# Patient Record
Sex: FEMALE | Race: WHITE | Employment: FULL TIME | URBAN - METROPOLITAN AREA
[De-identification: names, ages, dates, MRNs, and addresses within clinical notes are randomized per-mention and may not be internally consistent; named-entity substitution may affect disease eponyms.]

---

## 2017-08-28 ENCOUNTER — HOSPITAL ENCOUNTER (OUTPATIENT)
Dept: MAMMOGRAPHY | Facility: HOSPITAL | Age: 62
Discharge: HOME | End: 2017-08-28
Attending: OBSTETRICS & GYNECOLOGY
Payer: COMMERCIAL

## 2017-08-28 DIAGNOSIS — Z12.31 VISIT FOR SCREENING MAMMOGRAM: ICD-10-CM

## 2017-08-28 PROCEDURE — 77067 SCR MAMMO BI INCL CAD: CPT | Performed by: OBSTETRICS & GYNECOLOGY

## 2018-06-06 ENCOUNTER — APPOINTMENT (OUTPATIENT)
Dept: OTHER | Facility: HOSPITAL | Age: 63
End: 2018-06-06
Attending: EMERGENCY MEDICINE

## 2018-09-12 ENCOUNTER — HOSPITAL ENCOUNTER (OUTPATIENT)
Dept: MAMMOGRAPHY | Facility: HOSPITAL | Age: 63
Discharge: HOME | End: 2018-09-12
Attending: INTERNAL MEDICINE
Payer: COMMERCIAL

## 2018-09-12 DIAGNOSIS — Z12.31 ENCOUNTER FOR SCREENING MAMMOGRAM FOR MALIGNANT NEOPLASM OF BREAST: ICD-10-CM

## 2018-09-12 PROCEDURE — 77067 SCR MAMMO BI INCL CAD: CPT | Performed by: INTERNAL MEDICINE

## 2018-11-19 ENCOUNTER — HOSPITAL ENCOUNTER (OUTPATIENT)
Age: 63
Discharge: HOME | End: 2018-11-19
Payer: COMMERCIAL

## 2018-11-19 ENCOUNTER — APPOINTMENT (OUTPATIENT)
Dept: GENERAL RADIOLOGY | Age: 63
End: 2018-11-19
Attending: NURSE PRACTITIONER
Payer: COMMERCIAL

## 2018-11-19 VITALS
WEIGHT: 205 LBS | HEART RATE: 92 BPM | OXYGEN SATURATION: 100 % | BODY MASS INDEX: 35 KG/M2 | HEIGHT: 64 IN | TEMPERATURE: 98 F | RESPIRATION RATE: 20 BRPM | SYSTOLIC BLOOD PRESSURE: 155 MMHG | DIASTOLIC BLOOD PRESSURE: 76 MMHG

## 2018-11-19 DIAGNOSIS — S92.902A CLOSED FRACTURE OF LEFT FOOT, INITIAL ENCOUNTER: Primary | ICD-10-CM

## 2018-11-19 PROCEDURE — 29515 APPLICATION SHORT LEG SPLINT: CPT

## 2018-11-19 PROCEDURE — 73630 X-RAY EXAM OF FOOT: CPT | Performed by: NURSE PRACTITIONER

## 2018-11-19 PROCEDURE — 99214 OFFICE O/P EST MOD 30 MIN: CPT

## 2018-11-19 PROCEDURE — 99204 OFFICE O/P NEW MOD 45 MIN: CPT

## 2018-11-19 NOTE — ED INITIAL ASSESSMENT (HPI)
Missed last step wearing clogs yesterday twisting left foot. Pain and tenderness to lateral foot. Swelling present. +distal CMS.

## 2018-11-19 NOTE — ED PROVIDER NOTES
Patient presents with: Foot Injury      HPI:     Joshua Barajas is a 58year old female who presents today with a chief complaint of pain in the left lateral foot at the base of the fifth metatarsal after an injury that occurred yesterday.   The patient s above.  Constitutional and Vital Signs Reviewed.       Physical Exam:   Findings:  EXTREMITIES: no cyanosis or edema   Edema  Yes  Bruising:  Yes  Tendon function intact:  Yes  Skin intact:  Yes  Normal sensation: Yes  Normal capillary refill: Yes  ROM:  Pa crutches and has a walker she will use at home. We placed a postop shoe to the bottom of the post mold to help her until she gets home to her walker.   She is aware to ice, elevate, take ibuprofen or Tylenol for discomfort, and follow-up closely with wiliam

## 2018-11-19 NOTE — ED NOTES
Splint in place. Patient has her own walker at home. Copy of xray sent with patient for follow up. Assisted to car via wheelchair.

## 2019-05-13 ENCOUNTER — WALK IN (OUTPATIENT)
Dept: URGENT CARE | Age: 64
End: 2019-05-13

## 2019-05-13 VITALS
HEART RATE: 84 BPM | BODY MASS INDEX: 36.92 KG/M2 | TEMPERATURE: 98.6 F | RESPIRATION RATE: 16 BRPM | DIASTOLIC BLOOD PRESSURE: 80 MMHG | HEIGHT: 64 IN | WEIGHT: 216.27 LBS | SYSTOLIC BLOOD PRESSURE: 122 MMHG

## 2019-05-13 DIAGNOSIS — N30.00 ACUTE CYSTITIS WITHOUT HEMATURIA: Primary | ICD-10-CM

## 2019-05-13 DIAGNOSIS — R39.15 URINARY URGENCY: ICD-10-CM

## 2019-05-13 LAB
APPEARANCE, POC: CLEAR
BILIRUBIN, POC: NEGATIVE
COLOR, POC: NORMAL
GLUCOSE UR-MCNC: NEGATIVE MG/DL
KETONES, POC: NEGATIVE
NITRITE, POC: NEGATIVE
OCCULT BLOOD, POC: NEGATIVE
PH UR: 5 [PH] (ref 5–7)
PROT UR-MCNC: NEGATIVE G/DL
SP GR UR: 1 (ref 1–1.03)
UROBILINOGEN UR-MCNC: 0.2 MG/DL (ref 0–1)
WBC (LEUKOCYTE) ESTERASE, POC: NORMAL

## 2019-05-13 PROCEDURE — 99203 OFFICE O/P NEW LOW 30 MIN: CPT | Performed by: NURSE PRACTITIONER

## 2019-05-13 PROCEDURE — 81002 URINALYSIS NONAUTO W/O SCOPE: CPT | Performed by: NURSE PRACTITIONER

## 2019-05-13 PROCEDURE — 87086 URINE CULTURE/COLONY COUNT: CPT | Performed by: NURSE PRACTITIONER

## 2019-05-13 RX ORDER — PIOGLITAZONEHYDROCHLORIDE 15 MG/1
15 TABLET ORAL DAILY
COMMUNITY

## 2019-05-13 RX ORDER — ATORVASTATIN CALCIUM 20 MG/1
20 TABLET, FILM COATED ORAL DAILY
COMMUNITY

## 2019-05-13 RX ORDER — CIPROFLOXACIN 500 MG/1
500 TABLET, FILM COATED ORAL EVERY 12 HOURS
Qty: 10 TABLET | Refills: 0 | Status: SHIPPED | OUTPATIENT
Start: 2019-05-13 | End: 2019-05-18

## 2019-05-13 ASSESSMENT — ENCOUNTER SYMPTOMS
CHILLS: 0
CONSTITUTIONAL NEGATIVE: 1
BACK PAIN: 1
ALLERGIC/IMMUNOLOGIC NEGATIVE: 1
RESPIRATORY NEGATIVE: 1
NAUSEA: 1
FEVER: 0
EYES NEGATIVE: 1

## 2019-05-15 ENCOUNTER — TELEPHONE (OUTPATIENT)
Dept: SCHEDULING | Age: 64
End: 2019-05-15

## 2019-05-15 LAB
BACTERIA UR CULT: NORMAL
REPORT STATUS (RPT): NORMAL
SPECIMEN SOURCE: NORMAL

## 2019-05-16 ENCOUNTER — LAB ENCOUNTER (OUTPATIENT)
Dept: LAB | Facility: HOSPITAL | Age: 64
End: 2019-05-16
Attending: INTERNAL MEDICINE
Payer: COMMERCIAL

## 2019-05-16 DIAGNOSIS — M85.80 OSTEOPENIA: ICD-10-CM

## 2019-05-16 DIAGNOSIS — E11.9 DM2 (DIABETES MELLITUS, TYPE 2) (HCC): Primary | ICD-10-CM

## 2019-05-16 DIAGNOSIS — I10 HTN (HYPERTENSION): ICD-10-CM

## 2019-05-16 PROCEDURE — 84443 ASSAY THYROID STIM HORMONE: CPT

## 2019-05-16 PROCEDURE — 85025 COMPLETE CBC W/AUTO DIFF WBC: CPT

## 2019-05-16 PROCEDURE — 36415 COLL VENOUS BLD VENIPUNCTURE: CPT

## 2019-05-16 PROCEDURE — 80061 LIPID PANEL: CPT

## 2019-05-16 PROCEDURE — 82306 VITAMIN D 25 HYDROXY: CPT

## 2019-05-16 PROCEDURE — 83036 HEMOGLOBIN GLYCOSYLATED A1C: CPT

## 2019-05-16 PROCEDURE — 80053 COMPREHEN METABOLIC PANEL: CPT

## 2019-05-16 PROCEDURE — 82043 UR ALBUMIN QUANTITATIVE: CPT

## 2019-05-16 PROCEDURE — 82570 ASSAY OF URINE CREATININE: CPT

## 2019-05-29 ENCOUNTER — APPOINTMENT (OUTPATIENT)
Dept: OTHER | Facility: HOSPITAL | Age: 64
End: 2019-05-29
Attending: EMERGENCY MEDICINE

## 2019-10-03 ENCOUNTER — HOSPITAL ENCOUNTER (OUTPATIENT)
Dept: MAMMOGRAPHY | Facility: HOSPITAL | Age: 64
Discharge: HOME | End: 2019-10-03
Attending: INTERNAL MEDICINE
Payer: COMMERCIAL

## 2019-10-03 DIAGNOSIS — Z12.31 BREAST CANCER SCREENING BY MAMMOGRAM: ICD-10-CM

## 2019-10-03 PROCEDURE — 77063 BREAST TOMOSYNTHESIS BI: CPT | Performed by: INTERNAL MEDICINE

## 2019-10-03 PROCEDURE — 77067 SCR MAMMO BI INCL CAD: CPT | Performed by: INTERNAL MEDICINE

## 2019-10-31 ENCOUNTER — APPOINTMENT (OUTPATIENT)
Dept: URGENT CARE | Age: 64
End: 2019-10-31

## 2019-10-31 ENCOUNTER — WALK IN (OUTPATIENT)
Dept: URGENT CARE | Age: 64
End: 2019-10-31

## 2019-10-31 VITALS
HEART RATE: 80 BPM | BODY MASS INDEX: 34.15 KG/M2 | SYSTOLIC BLOOD PRESSURE: 122 MMHG | TEMPERATURE: 98.4 F | RESPIRATION RATE: 16 BRPM | WEIGHT: 200 LBS | DIASTOLIC BLOOD PRESSURE: 80 MMHG | HEIGHT: 64 IN

## 2019-10-31 DIAGNOSIS — N30.01 ACUTE CYSTITIS WITH HEMATURIA: ICD-10-CM

## 2019-10-31 DIAGNOSIS — R39.15 URINARY URGENCY: Primary | ICD-10-CM

## 2019-10-31 DIAGNOSIS — R30.0 DYSURIA: ICD-10-CM

## 2019-10-31 LAB
APPEARANCE, POC: ABNORMAL
BILIRUBIN, POC: NEGATIVE
COLOR, POC: YELLOW
GLUCOSE UR-MCNC: NEGATIVE MG/DL
KETONES, POC: NEGATIVE
NITRITE, POC: NEGATIVE
OCCULT BLOOD, POC: ABNORMAL
PH UR: 6 [PH] (ref 5–7)
PROT UR-MCNC: ABNORMAL G/DL
SP GR UR: 1 (ref 1–1.03)
UROBILINOGEN UR-MCNC: 0.2 MG/DL (ref 0–1)
WBC (LEUKOCYTE) ESTERASE, POC: ABNORMAL

## 2019-10-31 PROCEDURE — 81002 URINALYSIS NONAUTO W/O SCOPE: CPT | Performed by: NURSE PRACTITIONER

## 2019-10-31 PROCEDURE — 87186 SC STD MICRODIL/AGAR DIL: CPT | Performed by: NURSE PRACTITIONER

## 2019-10-31 PROCEDURE — 87086 URINE CULTURE/COLONY COUNT: CPT | Performed by: NURSE PRACTITIONER

## 2019-10-31 PROCEDURE — 87088 URINE BACTERIA CULTURE: CPT | Performed by: NURSE PRACTITIONER

## 2019-10-31 PROCEDURE — 99214 OFFICE O/P EST MOD 30 MIN: CPT | Performed by: NURSE PRACTITIONER

## 2019-10-31 RX ORDER — ATORVASTATIN CALCIUM 20 MG/1
20 TABLET, FILM COATED ORAL
COMMUNITY
Start: 2018-08-30 | End: 2019-10-31 | Stop reason: SDUPTHER

## 2019-10-31 RX ORDER — CIPROFLOXACIN 500 MG/1
500 TABLET, FILM COATED ORAL 2 TIMES DAILY
Qty: 10 TABLET | Refills: 0 | Status: SHIPPED | OUTPATIENT
Start: 2019-10-31 | End: 2019-11-05

## 2019-10-31 RX ORDER — SEMAGLUTIDE 1.34 MG/ML
INJECTION, SOLUTION SUBCUTANEOUS
Refills: 0 | COMMUNITY
Start: 2019-09-24

## 2019-10-31 RX ORDER — GLIMEPIRIDE 1 MG/1
TABLET ORAL
COMMUNITY
Start: 2016-08-30

## 2019-10-31 RX ORDER — UBIDECARENONE 75 MG
1000 CAPSULE ORAL
COMMUNITY

## 2019-10-31 ASSESSMENT — ENCOUNTER SYMPTOMS
SWEATS: 0
APPETITE CHANGE: 0
SINUS PRESSURE: 0
SHORTNESS OF BREATH: 0
SORE THROAT: 0
SINUS PAIN: 0
FATIGUE: 1
GASTROINTESTINAL NEGATIVE: 1
LIGHT-HEADEDNESS: 0
FEVER: 0
VOMITING: 0
EYES NEGATIVE: 1
CHILLS: 0
PSYCHIATRIC NEGATIVE: 1
CHEST TIGHTNESS: 0
RHINORRHEA: 0
DIZZINESS: 0
WHEEZING: 0
COUGH: 0
ALLERGIC/IMMUNOLOGIC NEGATIVE: 1
HEMATOLOGIC/LYMPHATIC NEGATIVE: 1
ABDOMINAL PAIN: 0
ENDOCRINE NEGATIVE: 1
NAUSEA: 0
RESPIRATORY NEGATIVE: 1

## 2019-11-03 ENCOUNTER — TELEPHONE (OUTPATIENT)
Dept: URGENT CARE | Age: 64
End: 2019-11-03

## 2019-11-03 LAB
BACTERIA UR CULT: ABNORMAL
ORGANISM: ABNORMAL
REPORT STATUS (RPT): ABNORMAL
SPECIMEN SOURCE: ABNORMAL

## 2020-11-17 ENCOUNTER — HOSPITAL ENCOUNTER (OUTPATIENT)
Dept: MAMMOGRAPHY | Facility: HOSPITAL | Age: 65
Discharge: HOME | End: 2020-11-17
Attending: INTERNAL MEDICINE
Payer: COMMERCIAL

## 2020-11-17 DIAGNOSIS — Z12.31 ENCOUNTER FOR SCREENING MAMMOGRAM FOR MALIGNANT NEOPLASM OF BREAST: ICD-10-CM

## 2020-11-17 PROCEDURE — 77063 BREAST TOMOSYNTHESIS BI: CPT | Performed by: INTERNAL MEDICINE

## 2020-11-17 PROCEDURE — 77067 SCR MAMMO BI INCL CAD: CPT | Performed by: INTERNAL MEDICINE

## 2021-04-11 DIAGNOSIS — Z23 NEED FOR VACCINATION: ICD-10-CM

## 2021-06-02 PROBLEM — M67.439 DORSAL WRIST GANGLION: Status: ACTIVE | Noted: 2021-06-02

## 2021-07-12 ENCOUNTER — TELEPHONE (OUTPATIENT)
Dept: OBGYN CLINIC | Facility: CLINIC | Age: 66
End: 2021-07-12

## 2021-07-12 ENCOUNTER — OFFICE VISIT (OUTPATIENT)
Dept: OBGYN CLINIC | Facility: CLINIC | Age: 66
End: 2021-07-12
Payer: COMMERCIAL

## 2021-07-12 VITALS
HEART RATE: 97 BPM | BODY MASS INDEX: 30 KG/M2 | SYSTOLIC BLOOD PRESSURE: 140 MMHG | WEIGHT: 172 LBS | DIASTOLIC BLOOD PRESSURE: 85 MMHG

## 2021-07-12 DIAGNOSIS — N81.4 UTERINE PROLAPSE: Primary | ICD-10-CM

## 2021-07-12 PROCEDURE — 3079F DIAST BP 80-89 MM HG: CPT | Performed by: OBSTETRICS & GYNECOLOGY

## 2021-07-12 PROCEDURE — 99202 OFFICE O/P NEW SF 15 MIN: CPT | Performed by: OBSTETRICS & GYNECOLOGY

## 2021-07-12 PROCEDURE — 3077F SYST BP >= 140 MM HG: CPT | Performed by: OBSTETRICS & GYNECOLOGY

## 2021-07-12 NOTE — TELEPHONE ENCOUNTER
Lmtcb. When pt calls back, OK TO USE NEW OB slot to add pt next week for pessary insertion per JLK. Please assist with scheduling. Thank you.

## 2021-07-12 NOTE — TELEPHONE ENCOUNTER
Pt saw Monique Rawls today for prolapsed bladder. Message to Monique Rawls if OK to add pt to one of your new ob slots next week for pessary?

## 2021-07-12 NOTE — TELEPHONE ENCOUNTER
Per PRINCESS, patient needs to schedule a pessary insertion ASAP. Please call patient on her cell phone, she will be out of house today  [phone].  Thank you

## 2021-07-13 NOTE — PROGRESS NOTES
Jeni Johnson is a 76year old female  No LMP recorded. (Menstrual status: Menopause). Patient presents with:  Gyn Problem: Pt says that she has a prolapsed bladder and has gotten worse. New pt. Dealing with bladder prolapse for several years. Sulfa Antibiotics             PHYSICAL EXAM:   /85   Pulse 97   Wt 172 lb (78 kg)   BMI 30.47 kg/m²   Body mass index is 30.47 kg/m².     Constitutional: well developed, well nourished       Pelvic Exam:  External Genitalia: normal appearance, hair

## 2021-07-21 ENCOUNTER — OFFICE VISIT (OUTPATIENT)
Dept: OBGYN CLINIC | Facility: CLINIC | Age: 66
End: 2021-07-21
Payer: COMMERCIAL

## 2021-07-21 VITALS
BODY MASS INDEX: 31 KG/M2 | WEIGHT: 172.63 LBS | SYSTOLIC BLOOD PRESSURE: 143 MMHG | DIASTOLIC BLOOD PRESSURE: 85 MMHG | HEART RATE: 90 BPM

## 2021-07-21 DIAGNOSIS — N81.4 UTERINE PROLAPSE: Primary | ICD-10-CM

## 2021-07-21 PROCEDURE — 3079F DIAST BP 80-89 MM HG: CPT | Performed by: OBSTETRICS & GYNECOLOGY

## 2021-07-21 PROCEDURE — 57160 INSERT PESSARY/OTHER DEVICE: CPT | Performed by: OBSTETRICS & GYNECOLOGY

## 2021-07-21 PROCEDURE — A4562 PESSARY, NON RUBBER,ANY TYPE: HCPCS | Performed by: OBSTETRICS & GYNECOLOGY

## 2021-07-21 PROCEDURE — 3077F SYST BP >= 140 MM HG: CPT | Performed by: OBSTETRICS & GYNECOLOGY

## 2021-07-21 NOTE — PROGRESS NOTES
Pessary Insertion     Alyssa Orellana [id number]  here for pessary insertion. Last seen 7/12/21. I tried #4 and #5 incontinence dish. Pt felt the knob on the gell horn    Pessary type:  #5 Shaatz pessary    Patient tolerated the procedure well.       A

## 2021-09-23 ENCOUNTER — OFFICE VISIT (OUTPATIENT)
Dept: OBGYN CLINIC | Facility: CLINIC | Age: 66
End: 2021-09-23
Payer: COMMERCIAL

## 2021-09-23 VITALS
DIASTOLIC BLOOD PRESSURE: 86 MMHG | WEIGHT: 172 LBS | HEART RATE: 91 BPM | SYSTOLIC BLOOD PRESSURE: 138 MMHG | BODY MASS INDEX: 30 KG/M2

## 2021-09-23 DIAGNOSIS — N81.4 UTERINE PROLAPSE: Primary | ICD-10-CM

## 2021-09-23 PROCEDURE — 3075F SYST BP GE 130 - 139MM HG: CPT | Performed by: OBSTETRICS & GYNECOLOGY

## 2021-09-23 PROCEDURE — 99213 OFFICE O/P EST LOW 20 MIN: CPT | Performed by: OBSTETRICS & GYNECOLOGY

## 2021-09-23 PROCEDURE — 3079F DIAST BP 80-89 MM HG: CPT | Performed by: OBSTETRICS & GYNECOLOGY

## 2021-09-24 NOTE — PROGRESS NOTES
Pessary Check     Rhiannon Boland [id number]  here for pessary check. Last seen 7/21/21. Doing well with pessary. Has good support. Has no complaints. Incontinence better.       Pessary type:  #5 Shaatz pessary    Pessary removed, cleansed and replace

## 2021-11-11 ENCOUNTER — APPOINTMENT (OUTPATIENT)
Dept: CT IMAGING | Age: 66
End: 2021-11-11
Attending: EMERGENCY MEDICINE
Payer: COMMERCIAL

## 2021-11-11 ENCOUNTER — HOSPITAL ENCOUNTER (OUTPATIENT)
Age: 66
Discharge: HOME | End: 2021-11-11
Payer: COMMERCIAL

## 2021-11-11 VITALS
OXYGEN SATURATION: 97 % | HEART RATE: 90 BPM | SYSTOLIC BLOOD PRESSURE: 140 MMHG | RESPIRATION RATE: 24 BRPM | DIASTOLIC BLOOD PRESSURE: 70 MMHG | TEMPERATURE: 99 F

## 2021-11-11 DIAGNOSIS — N12 PYELONEPHRITIS: Primary | ICD-10-CM

## 2021-11-11 DIAGNOSIS — R10.31 ABDOMINAL PAIN, RIGHT LOWER QUADRANT: ICD-10-CM

## 2021-11-11 PROCEDURE — 96360 HYDRATION IV INFUSION INIT: CPT

## 2021-11-11 PROCEDURE — 74177 CT ABD & PELVIS W/CONTRAST: CPT | Performed by: EMERGENCY MEDICINE

## 2021-11-11 PROCEDURE — 85025 COMPLETE CBC W/AUTO DIFF WBC: CPT | Performed by: EMERGENCY MEDICINE

## 2021-11-11 PROCEDURE — 99214 OFFICE O/P EST MOD 30 MIN: CPT

## 2021-11-11 PROCEDURE — 80047 BASIC METABLC PNL IONIZED CA: CPT

## 2021-11-11 PROCEDURE — 81002 URINALYSIS NONAUTO W/O SCOPE: CPT

## 2021-11-11 RX ORDER — CIPROFLOXACIN 500 MG/1
500 TABLET, FILM COATED ORAL 2 TIMES DAILY
Qty: 14 TABLET | Refills: 0 | Status: SHIPPED | OUTPATIENT
Start: 2021-11-11 | End: 2021-11-18

## 2021-11-11 RX ORDER — SODIUM CHLORIDE 9 MG/ML
1000 INJECTION, SOLUTION INTRAVENOUS ONCE
Status: COMPLETED | OUTPATIENT
Start: 2021-11-11 | End: 2021-11-11

## 2021-11-11 NOTE — ED INITIAL ASSESSMENT (HPI)
Presents ambulatory to room 4 with 6 days of RLQ pain. Feels bloated. Some nausea. Vomited x 2. Periods of constipation and then diarrhea. No fever at home but c/o chills. Denies urinary symptoms. Decreased appetite.  Hx of diabetes with \"high\" BS at home

## 2021-11-12 NOTE — ED PROVIDER NOTES
Patient Seen in: [facility]      History   Patient presents with:  Abdominal Pain    Stated Complaint: abdominal pain,vomiting,diahrrea    Subjective:   HPI  Kathy Smith is a 78year old female here for right lower quadrant abdominal pain round, and reactive to light. Cardiovascular:      Rate and Rhythm: Normal rate. Pulses: Normal pulses. Pulmonary:      Effort: Pulmonary effort is normal.   Abdominal:      Tenderness: There is abdominal tenderness in the right lower quadrant.  James Puneet Collection Time: 11/11/21  3:57 PM   Result Value Ref Range    ISTAT Sodium 131 (L) 136 - 145 mmol/L    ISTAT BUN 15 7 - 18 mg/dL    ISTAT Potassium 4.7 3.6 - 5.1 mmol/L    ISTAT Chloride 96 (L) 98 - 112 mmol/L    ISTAT Ionized Calcium 1.12 1.12 - 1.32 mmo distal colonic diverticulosis. Small hiatal hernia. 5. Lesser incidental findings as above.     Dictated by (CST): Geneva Moeller MD on 11/11/2021 at 4:38 PM     Finalized by (CST): Geneva Moeller MD on 11/11/2021 at 4:46 PM            [facility]   ABD Pa daily for 7 days. , Normal, Disp-14 tablet, R-0

## 2022-03-09 ENCOUNTER — HOSPITAL ENCOUNTER (OUTPATIENT)
Dept: MAMMOGRAPHY | Facility: HOSPITAL | Age: 67
Discharge: HOME | End: 2022-03-09
Attending: INTERNAL MEDICINE
Payer: COMMERCIAL

## 2022-03-09 DIAGNOSIS — Z12.31 VISIT FOR SCREENING MAMMOGRAM: ICD-10-CM

## 2022-03-09 PROCEDURE — 77063 BREAST TOMOSYNTHESIS BI: CPT | Performed by: INTERNAL MEDICINE

## 2022-03-09 PROCEDURE — 77067 SCR MAMMO BI INCL CAD: CPT | Performed by: INTERNAL MEDICINE

## 2022-09-16 ENCOUNTER — OFFICE VISIT (OUTPATIENT)
Dept: OBGYN CLINIC | Facility: CLINIC | Age: 67
End: 2022-09-16
Payer: COMMERCIAL

## 2022-09-16 VITALS
HEIGHT: 62 IN | BODY MASS INDEX: 29.88 KG/M2 | DIASTOLIC BLOOD PRESSURE: 77 MMHG | SYSTOLIC BLOOD PRESSURE: 121 MMHG | WEIGHT: 162.38 LBS | HEART RATE: 102 BPM

## 2022-09-16 DIAGNOSIS — N81.4 UTERINE PROLAPSE: ICD-10-CM

## 2022-09-16 DIAGNOSIS — Z12.31 ENCOUNTER FOR SCREENING MAMMOGRAM FOR BREAST CANCER: ICD-10-CM

## 2022-09-16 DIAGNOSIS — Z01.419 ENCOUNTER FOR GYNECOLOGICAL EXAMINATION: Primary | ICD-10-CM

## 2022-09-16 PROCEDURE — 3074F SYST BP LT 130 MM HG: CPT | Performed by: OBSTETRICS & GYNECOLOGY

## 2022-09-16 PROCEDURE — G0101 CA SCREEN;PELVIC/BREAST EXAM: HCPCS | Performed by: OBSTETRICS & GYNECOLOGY

## 2022-09-16 PROCEDURE — 3008F BODY MASS INDEX DOCD: CPT | Performed by: OBSTETRICS & GYNECOLOGY

## 2022-09-16 PROCEDURE — 3078F DIAST BP <80 MM HG: CPT | Performed by: OBSTETRICS & GYNECOLOGY

## 2022-09-19 LAB — HPV I/H RISK 1 DNA SPEC QL NAA+PROBE: NEGATIVE

## 2023-03-30 ENCOUNTER — HOSPITAL ENCOUNTER (OUTPATIENT)
Dept: MAMMOGRAPHY | Facility: HOSPITAL | Age: 68
Discharge: HOME | End: 2023-03-30
Attending: OBSTETRICS & GYNECOLOGY
Payer: COMMERCIAL

## 2023-03-30 DIAGNOSIS — Z12.31 ENCOUNTER FOR SCREENING MAMMOGRAM FOR BREAST CANCER: ICD-10-CM

## 2023-03-30 PROCEDURE — 77063 BREAST TOMOSYNTHESIS BI: CPT | Performed by: OBSTETRICS & GYNECOLOGY

## 2023-03-30 PROCEDURE — 77067 SCR MAMMO BI INCL CAD: CPT | Performed by: OBSTETRICS & GYNECOLOGY

## 2023-03-31 ENCOUNTER — HOSPITAL ENCOUNTER (OUTPATIENT)
Dept: BONE DENSITY | Facility: HOSPITAL | Age: 68
Discharge: HOME | End: 2023-03-31
Attending: INTERNAL MEDICINE
Payer: COMMERCIAL

## 2023-03-31 DIAGNOSIS — Z78.0 ASYMPTOMATIC AGE-RELATED POSTMENOPAUSAL STATE: ICD-10-CM

## 2023-03-31 PROCEDURE — 77080 DXA BONE DENSITY AXIAL: CPT | Performed by: INTERNAL MEDICINE

## 2023-05-06 ENCOUNTER — OFFICE VISIT (OUTPATIENT)
Dept: FAMILY MEDICINE CLINIC | Facility: CLINIC | Age: 68
End: 2023-05-06
Payer: MEDICARE

## 2023-05-06 VITALS
HEART RATE: 91 BPM | TEMPERATURE: 99 F | OXYGEN SATURATION: 100 % | RESPIRATION RATE: 20 BRPM | BODY MASS INDEX: 30 KG/M2 | SYSTOLIC BLOOD PRESSURE: 132 MMHG | DIASTOLIC BLOOD PRESSURE: 84 MMHG | HEIGHT: 62 IN | WEIGHT: 163 LBS

## 2023-05-06 DIAGNOSIS — J32.9 RHINOSINUSITIS: Primary | ICD-10-CM

## 2023-05-06 DIAGNOSIS — H10.33 ACUTE CONJUNCTIVITIS OF BOTH EYES, UNSPECIFIED ACUTE CONJUNCTIVITIS TYPE: ICD-10-CM

## 2023-05-06 DIAGNOSIS — J31.0 RHINOSINUSITIS: Primary | ICD-10-CM

## 2023-05-06 LAB
CONTROL LINE PRESENT WITH A CLEAR BACKGROUND (YES/NO): YES YES/NO
KIT EXPIRATION DATE: NORMAL DATE
STREP GRP A CUL-SCR: NEGATIVE

## 2023-05-06 RX ORDER — OFLOXACIN 3 MG/ML
SOLUTION/ DROPS OPHTHALMIC
Qty: 1 EACH | Refills: 0 | Status: SHIPPED | OUTPATIENT
Start: 2023-05-06

## 2023-05-06 RX ORDER — AMOXICILLIN AND CLAVULANATE POTASSIUM 875; 125 MG/1; MG/1
1 TABLET, FILM COATED ORAL 2 TIMES DAILY
Qty: 14 TABLET | Refills: 0 | Status: SHIPPED | OUTPATIENT
Start: 2023-05-06 | End: 2023-05-13

## 2023-05-06 RX ORDER — INSULIN DEGLUDEC INJECTION 100 U/ML
10 INJECTION, SOLUTION SUBCUTANEOUS NIGHTLY
COMMUNITY
Start: 2023-04-23

## 2023-05-06 RX ORDER — SEMAGLUTIDE 1.34 MG/ML
INJECTION, SOLUTION SUBCUTANEOUS
COMMUNITY
Start: 2023-01-13

## 2023-06-01 ENCOUNTER — HOSPITAL ENCOUNTER (OUTPATIENT)
Dept: INTERVENTIONAL RADIOLOGY/VASCULAR | Facility: HOSPITAL | Age: 68
Discharge: HOME | End: 2023-06-01
Attending: INTERNAL MEDICINE | Admitting: INTERNAL MEDICINE
Payer: MEDICARE

## 2023-06-01 VITALS
WEIGHT: 164 LBS | OXYGEN SATURATION: 93 % | HEIGHT: 63 IN | TEMPERATURE: 99 F | BODY MASS INDEX: 29.06 KG/M2 | SYSTOLIC BLOOD PRESSURE: 134 MMHG | RESPIRATION RATE: 16 BRPM | DIASTOLIC BLOOD PRESSURE: 79 MMHG | HEART RATE: 88 BPM

## 2023-06-01 DIAGNOSIS — I25.10 CAD (CORONARY ARTERY DISEASE): ICD-10-CM

## 2023-06-01 LAB — GLUCOSE BLDC GLUCOMTR-MCNC: 146 MG/DL (ref 70–99)

## 2023-06-01 PROCEDURE — 99152 MOD SED SAME PHYS/QHP 5/>YRS: CPT | Performed by: INTERNAL MEDICINE

## 2023-06-01 PROCEDURE — B2151ZZ FLUOROSCOPY OF LEFT HEART USING LOW OSMOLAR CONTRAST: ICD-10-PCS | Performed by: INTERNAL MEDICINE

## 2023-06-01 PROCEDURE — 82962 GLUCOSE BLOOD TEST: CPT

## 2023-06-01 PROCEDURE — 4A023N7 MEASUREMENT OF CARDIAC SAMPLING AND PRESSURE, LEFT HEART, PERCUTANEOUS APPROACH: ICD-10-PCS | Performed by: INTERNAL MEDICINE

## 2023-06-01 PROCEDURE — B2111ZZ FLUOROSCOPY OF MULTIPLE CORONARY ARTERIES USING LOW OSMOLAR CONTRAST: ICD-10-PCS | Performed by: INTERNAL MEDICINE

## 2023-06-01 PROCEDURE — 93458 L HRT ARTERY/VENTRICLE ANGIO: CPT | Performed by: INTERNAL MEDICINE

## 2023-06-01 RX ORDER — LIDOCAINE HYDROCHLORIDE 20 MG/ML
INJECTION, SOLUTION EPIDURAL; INFILTRATION; INTRACAUDAL; PERINEURAL
Status: COMPLETED
Start: 2023-06-01 | End: 2023-06-01

## 2023-06-01 RX ORDER — MIDAZOLAM HYDROCHLORIDE 1 MG/ML
INJECTION INTRAMUSCULAR; INTRAVENOUS
Status: COMPLETED
Start: 2023-06-01 | End: 2023-06-01

## 2023-06-01 RX ORDER — ASPIRIN 81 MG/1
324 TABLET, CHEWABLE ORAL ONCE
Status: DISCONTINUED | OUTPATIENT
Start: 2023-06-01 | End: 2023-06-01

## 2023-06-01 RX ORDER — SODIUM CHLORIDE 9 MG/ML
INJECTION, SOLUTION INTRAVENOUS
Status: COMPLETED | OUTPATIENT
Start: 2023-06-01 | End: 2023-06-01

## 2023-06-01 RX ADMIN — SODIUM CHLORIDE: 9 INJECTION, SOLUTION INTRAVENOUS at 12:15:00

## 2023-06-01 NOTE — INTERVAL H&P NOTE
Pre-op Diagnosis: * No pre-op diagnosis entered *    The above referenced H&P was reviewed by Gemma Escobar MD on 6/1/2023, the patient was examined and no significant changes have occurred in the patient's condition since the H&P was performed. I discussed with the patient and/or legal representative the potential benefits, risks and side effects of this procedure; the likelihood of the patient achieving goals; and potential problems that might occur during recuperation. I discussed reasonable alternatives to the procedure, including risks, benefits and side effects related to the alternatives and risks related to not receiving this procedure. We will proceed with procedure as planned.

## 2023-06-01 NOTE — INTERVAL H&P NOTE
Pre-op Diagnosis: * No pre-op diagnosis entered *    The above referenced H&P was reviewed by Michael Ross MD on 6/1/2023, the patient was examined and no significant changes have occurred in the patient's condition since the H&P was performed. I discussed with the patient and/or legal representative the potential benefits, risks and side effects of this procedure; the likelihood of the patient achieving goals; and potential problems that might occur during recuperation. I discussed reasonable alternatives to the procedure, including risks, benefits and side effects related to the alternatives and risks related to not receiving this procedure. We will proceed with procedure as planned.

## 2023-06-01 NOTE — PROCEDURES
Outpatient Surgery Brief Discharge Summary         Amanda Stanton   [id number]   75year old    10/29/1955     Discharge Diagnoses: Angina    Procedures: Left, LV, core    Discharged Condition: stable    Disposition: home    Patient Instructions: Follow-up with Travis Pham MD in 1-2 weeks. Diet: regular diet  Activity: Do not drive for 24 hours. Do not lift more than 10 pounds for 7 days.     Travis Pham MD  6/1/2023   1:56 PM

## 2023-06-01 NOTE — PROCEDURES
[facility]    Cardiac Cath Procedure Note    Teofilo President Patient Status:  Outpatient    10/29/1955 MRN [id number]   Location [facility] Attending Bernadette Gomes MD   Hosp Day # 0 PCP Ace Barrios MD       Cardiologist: Marek Donato MD  Primary Proceduralist: Marek Donato MD  Procedure Performed: LHC, COR and LV  Date of Procedure: 2023   Indication: Angina    Summary of procedure: Left heart cath via right femoral artery      Left Ventriculography and hemodynamics:   LV EF 55  LV EDP 17  No gradient across aortic valve      Coronary Angiography  RCA:  Dominant and free of obstructive disease, supplies PDA and PL    Left main:  Free of obstructive disease    Left anterior descending:  Free of obstructive disease, supplies  diagonals which are non-obstructive    Circumflex:  Free of obstructive disease, supplies  OM branches which are patent; moderately calcified with 50% stenosis in OM      Assessment:  Nonobstructive coronary disease with preserved LV function      Recommendations:  Medical therapy      Description of Procedure:   After written informed consent was obtained from the patient, patient was brought to the cardiac catheterization laboratory. Patient was prepped and draped in the usual sterile fashion. Lidocaine 1% was used to infiltrate the right groin for local anesthesia and a 6 Chinese introducer sheath was inserted into the right femoral artery. Selective coronary angiography performed with JR4 catheter for RCA and JL4 catheter for LCA. Angiography performed in standard projections. 6 Occitan  catheter placed in LV for hemodynamics. The problem solve her  at the miscellaneous      Specimen sent to: No specimen collected  Estimated blood loss: 10 cc  Closure: Mynx      IV was maintained by RN and moderate conscious sedation of 2 mg versed and 50 mcg fentanyl was given.   Patient was assessed and monitoring of oxygen, heart rate and blood pressure by nurse and myself during the exam 22 minutes.       Aurelia Miller MD  06/01/23

## 2023-06-01 NOTE — IVS NOTE
DISCHARGE NOTE    1, PATIENT AWAKE AND ALERT X 4    2. CORREIA, VSS, NO PONV, NO PAIN    3. INSTRUCTIONS GIVEN TO PATIENT AND FAMILY    4. IV ACCESS WAS REMOVED BEFORE DISCHARGE    5. DR. Monroe        SPOKE WITH PATIENT AND FAMILY POST PROCEDURE    6. PATIENT ABLE TO DRINK, [address], VOID    7. PROCEDURAL SITE REMAINS DRY, INTACT WITH GOOD CIRCULATION,    MOVEMENT AND SENSATION    8. FOLLOW UP APPOINTMENT WITH Dr Genesis Lee    9. PATIENT DISCHARGED VIA WHEEL CHAIR TO main entrance    10.  NEW PRESCRIPTION: n/a

## 2024-04-01 ENCOUNTER — HOSPITAL ENCOUNTER (OUTPATIENT)
Dept: MAMMOGRAPHY | Facility: HOSPITAL | Age: 69
Discharge: HOME | End: 2024-04-01
Attending: INTERNAL MEDICINE
Payer: MEDICARE

## 2024-04-01 DIAGNOSIS — Z12.31 ENCOUNTER FOR MAMMOGRAM TO ESTABLISH BASELINE MAMMOGRAM: ICD-10-CM

## 2024-04-01 PROCEDURE — 77067 SCR MAMMO BI INCL CAD: CPT | Performed by: INTERNAL MEDICINE

## 2024-04-01 PROCEDURE — 77063 BREAST TOMOSYNTHESIS BI: CPT | Performed by: INTERNAL MEDICINE

## 2024-05-29 ENCOUNTER — OFFICE VISIT (OUTPATIENT)
Dept: OBGYN CLINIC | Facility: CLINIC | Age: 69
End: 2024-05-29

## 2024-05-29 VITALS
HEIGHT: 63 IN | SYSTOLIC BLOOD PRESSURE: 155 MMHG | BODY MASS INDEX: 29.91 KG/M2 | HEART RATE: 84 BPM | WEIGHT: 168.81 LBS | DIASTOLIC BLOOD PRESSURE: 88 MMHG

## 2024-05-29 DIAGNOSIS — Z01.419 ENCOUNTER FOR GYNECOLOGICAL EXAMINATION: Primary | ICD-10-CM

## 2024-05-29 DIAGNOSIS — Z12.31 ENCOUNTER FOR SCREENING MAMMOGRAM FOR BREAST CANCER: ICD-10-CM

## 2024-05-29 DIAGNOSIS — N81.4 UTERINE PROLAPSE: ICD-10-CM

## 2024-05-29 PROCEDURE — 3077F SYST BP >= 140 MM HG: CPT | Performed by: OBSTETRICS & GYNECOLOGY

## 2024-05-29 PROCEDURE — 3008F BODY MASS INDEX DOCD: CPT | Performed by: OBSTETRICS & GYNECOLOGY

## 2024-05-29 PROCEDURE — 99213 OFFICE O/P EST LOW 20 MIN: CPT | Performed by: OBSTETRICS & GYNECOLOGY

## 2024-05-29 PROCEDURE — 3079F DIAST BP 80-89 MM HG: CPT | Performed by: OBSTETRICS & GYNECOLOGY

## 2024-05-29 PROCEDURE — 1159F MED LIST DOCD IN RCRD: CPT | Performed by: OBSTETRICS & GYNECOLOGY

## 2024-05-29 PROCEDURE — 1160F RVW MEDS BY RX/DR IN RCRD: CPT | Performed by: OBSTETRICS & GYNECOLOGY

## 2024-05-29 NOTE — PROGRESS NOTES
Janice Hewitt is a 68 year old female  No LMP recorded. (Menstrual status: Menopause). here for annual exam.       Last seen 2022.   Last pap 2022 normal with negative human papillomavirus  Last mammogram 2024    Doing well with Shaatz pessary #5.  Cleans it daily.  Has good support.  Still with some incontinence.  Does not want surgery.         OBSTETRICS HISTORY:  OB History    Para Term  AB Living   3 2 2 0 1 2   SAB IAB Ectopic Multiple Live Births   1 0 0 0 2       GYNE HISTORY   Pap Date: 22  Pap Result Notes: neg hpv neg, mammo 24 sae neg asses.3/31/23 dexa normal    MEDICAL HISTORY:  Past Medical History:    Diabetes (HCC)    Hypertension     Past Surgical History:   Procedure Laterality Date    Cholecystectomy      Other surgical history      D&C    Tonsillectomy         SOCIAL HISTORY:  Social History     Socioeconomic History    Marital status:    Tobacco Use    Smoking status: Never    Smokeless tobacco: Never   Substance and Sexual Activity    Alcohol use: Yes     Alcohol/week: 0.0 standard drinks of alcohol    Drug use: No    Sexual activity: Not Currently       FAMILY HISTORY:  No family history on file.    MEDICATIONS:  Current Outpatient Medications   Medication Sig Dispense Refill    OZEMPIC, 1 MG/DOSE, 4 MG/3ML Subcutaneous Solution Pen-injector INJECT 1 MG INTO THE SKIN WEEKLY.      metFORMIN HCl 1000 MG Oral Tab Take 1 tablet (1,000 mg total) by mouth 2 (two) times daily with meals.      TRESIBA FLEXTOUCH 100 UNIT/ML Subcutaneous Solution Pen-injector Inject 10 Units into the skin nightly. AT BEDTIME      ofloxacin 0.3 % Ophthalmic Solution Instill 1-2 gtt in both eyes q2-4h x2 days, then 1-2 gtt qid x5 days 1 each 0    Blood Glucose Monitoring Suppl Does not apply Device As Directed      atorvastatin 20 MG Oral Tab Take 1 tablet (20 mg total) by mouth nightly. 90 tablet 3    lisinopril 40 MG Oral Tab Take 1 tablet (40 mg total) by mouth daily. 90  tablet 3    Semaglutide, 1 MG/DOSE, 2 MG/1.5ML Subcutaneous Solution Pen-injector Inject into the skin.      aspirin 81 MG Oral Tab Take 1 tablet (81 mg total) by mouth daily.      Coenzyme Q10 (COQ10 OR) Take by mouth.      Cholecalciferol (VITAMIN D3) 2000 UNITS Oral Tab Take by mouth.      Vitamin B-12 1000 MCG Oral Tab Take 1 tablet (1,000 mcg total) by mouth daily.      Ascorbic Acid (BRYON-C OR) Take by mouth.      CRANBERRY OR Take by mouth.      DAILY MULTIPLE VITAMINS Oral Tab Take 1 tablet by mouth daily.      Linolenic Acid (OMEGA 3 OR) Take by mouth.      Magnesium Chloride (MAGNESIUM DR OR) Take by mouth.         ALLERGIES:    Allergies   Allergen Reactions    Sulfa Antibiotics HIVES    Codeine          Depression Screening (PHQ-2/PHQ-9): Over the LAST 2 WEEKS   Little interest or pleasure in doing things: Not at all    Feeling down, depressed, or hopeless: Not at all    PHQ-2 SCORE: 0           Review of Systems:  Constitutional:  Denies fatigue, night sweats, hot flashes  Eyes:  denies blurred or double vision  Cardiovascular:  denies chest pain or palpitations  Respiratory:  denies shortness of breath  Gastrointestinal:  denies heartburn, abdominal pain, diarrhea or constipation  Genitourinary:  denies dysuria, incontinence, abnormal vaginal discharge, vaginal itching  Musculoskeletal:  denies back pain.  Skin/Breast:  Denies any breast pain, lumps, or discharge.   Neurological:  denies headaches, extremity weakness or numbness.  Psychiatric: denies depression or anxiety.  Endocrine:   denies excessive thirst or urination.  Heme/Lymph:  easy bruising or bleeding.    PHYSICAL EXAM:   /88   Pulse 84   Ht 5' 3\" (1.6 m)   Wt 168 lb 12.8 oz (76.6 kg)   BMI 29.90 kg/m²   Wt Readings from Last 2 Encounters:   05/29/24 168 lb 12.8 oz (76.6 kg)   05/24/23 164 lb (74.4 kg)     Body mass index is 29.9 kg/m².    Constitutional: well developed, well nourished  Neck/Thyroid: thyroid symmetric, no  nodules  Heart:  Regular rate and rhythm  Lungs:  Clear to asculation  Breast: normal without palpable masses, tenderness, asymmetry, nipple discharge, nipple retraction or skin changes  Abdomen:  soft, nontender, nondistended, no masses  Psychiatric:  Oriented to time, place, person and situation. Appropriate mood and affect    Pelvic Exam:  External Genitalia: normal appearance, hair distribution, and no lesions  Urethral Meatus:  normal in size, location, without lesions and prolapse  Bladder:  No fullness, masses or tenderness  Vagina:  Normal appearance without lesions, no abnormal discharge  Cervix:  Normal without tenderness on motion  Uterus: normal in size, contour, position, mobility, without tenderness  Adnexa: normal without masses or tenderness  Perineum/anus: normal    Shaatz pessary removed and cleaned and replaced    Assessment & Plan:    Janice Hewitt is a 68 year old female who presents for an annual physical exam.    1. Encounter for gynecological examination  Pap not done.  ASCCP guidelines reviewed.   Encouraged annual exam.   Order for mammogram to be done 4/2025.   RTC 1 year or prn     2. Encounter for screening mammogram for breast cancer  - [facility] F[facility]EN 2D+3D SCREENING BILAT (CPT=77067/98784); Future    3. Uterine prolapse  Continue Shaatz pessary        Requested Prescriptions      No prescriptions requested or ordered in this encounter         Yanely Hughes MD  5/29/2024  5:40 PM

## 2025-01-21 ENCOUNTER — OFFICE VISIT (OUTPATIENT)
Dept: ENDOCRINOLOGY CLINIC | Facility: CLINIC | Age: 70
End: 2025-01-21

## 2025-01-21 VITALS — DIASTOLIC BLOOD PRESSURE: 94 MMHG | SYSTOLIC BLOOD PRESSURE: 168 MMHG

## 2025-01-21 DIAGNOSIS — Z79.4 TYPE 2 DIABETES MELLITUS WITH HYPERGLYCEMIA, WITH LONG-TERM CURRENT USE OF INSULIN (HCC): Primary | ICD-10-CM

## 2025-01-21 DIAGNOSIS — E11.65 TYPE 2 DIABETES MELLITUS WITH HYPERGLYCEMIA, WITH LONG-TERM CURRENT USE OF INSULIN (HCC): Primary | ICD-10-CM

## 2025-01-21 LAB
GLUCOSE BLOOD: 221
HEMOGLOBIN A1C: 7.5 % (ref 4.3–5.6)
TEST STRIP LOT #: NORMAL NUMERIC

## 2025-01-21 RX ORDER — INSULIN DEGLUDEC 100 U/ML
16 INJECTION, SOLUTION SUBCUTANEOUS NIGHTLY
COMMUNITY
Start: 2025-01-21

## 2025-01-21 RX ORDER — TIRZEPATIDE 5 MG/.5ML
5 INJECTION, SOLUTION SUBCUTANEOUS WEEKLY
Qty: 6 ML | Refills: 0 | Status: SHIPPED | OUTPATIENT
Start: 2025-03-03

## 2025-01-21 NOTE — PROGRESS NOTES
Name: Janice Hewitt  Date: 2025    Referring Physician: No ref. provider found    CHIEF COMPLAINT   Chief Complaint   Patient presents with    Diabetes     Diabetes consult     HISTORY OF PRESENT ILLNESS   Janice Hewitt is a 69 year old female who presents for new consultation on diabetes management.   HbA1C: 7.5% at POC today. Previously was 7.0% on 2024.   Blood glucose is: 221 in clinic today.     FAMILY HISTORY OF DIABETES  - both sides of the family   - has grandson who has T1DM  DIABETES HISTORY  Diagnosed: around 20 years; stated as GDM  - started insulin therapy 3/2020  Prior HbA, C or glycohemoglobin were 7.5% at POC today;     Patient has had hospitalizations for blood sugar issues related to hyperglycemia in pregnancy (while having GDM).   Denies any history of pancreatitis.     PREVIOUS MEDICATION FOR DM:  - Glipizide - does not remember why it was stopped   - Trulicity -d/c'ed GI intolerance symptoms     CURRENT MEDICATIONS FOR DM:  - Tresiba 13 units nightly   - Metformin 1,000mg twice daily   - Ozempic 1mg once weekly  - did not tolerate higher dose; feeling gassy/bloated in the first 1-2 days after injection day; has been taking gas-x and this has been helping     HOME GLUCOSE READINGS:   Testing BG x  1 daily  Meter or log book today: no  Fastin today; since 2024 has been 120-140s; occ 150    Previously average  fasting was in 100- 110  Hypoglycemia present: no   Hypoglycemia awareness: yes - lightheadedness; dizziness  Hypoglycemia treatment: yes - eats a carb     HISTORY OF DIABETES COMPLICATIONS:  History of Retinopathy: denies  - last eye exam within the last 12 months: yes - 2024 - followed by Dr. Mejia   History of Neuropathy: yes bilateral great toes intermittently but infrequently occurring   History of Nephropathy: denies outside of kidney infection     ASSOCIATED COMPLICATIONS:   HTN: yes   Hyperlipidemia: yes   Cardiovascular Disease: yes - angio showed CAD with 20%  blockage in 2022  Peripheral Vascular Disease: denies     DIETARY COMPLIANCE:  Fair; due to cold wether and holidays;  She typically rinse or rice, refrigerates overnight and then cooks it;   - occasionally eating sweets (cookies or dark chocolate)  - V8 or cranberry juice -- usually drinking 2-3x weekly   - infrequently drinking diet soda    EXERCISE:   No     Polyuria, polyphagia, polydipsia: denies   Paresthesias: yes - intermittently and infrequently occurring to bilateral great toes   Blurred vision: denies   Recent steroids, illness or infections: no    REVIEW OF SYSTEMS  Constitutional: Negative for: weight change, fever, fatigue, cold/heat intolerance  Eyes: Negative for:  Visual changes, proptosis, blurring  ENT: Negative for:  dysphagia, neck swelling, dysphonia  Respiratory: Negative for: hemoptysis, shortness of breath, cough, or dyspnea.  Cardiovascular: Negative for:  chest pain, chest discomfort, palpitations  GI: Negative for:  abdominal pain, nausea, vomiting, diarrhea, heartburn, constipation  Neurology: Negative for: headache, dizziness, syncope, numbness/tingling, or weakness.   Genito-Urinary: Negative for: dysuria, frequency or hematuria   Hematology/Lymphatics: Negative for: bruising, easy bleeding, lower extremity edema  Skin: Negative for: rash, blister, infection or ulcers.  Endocrine: Negative for: polyuria, polydipsia. no osteoporosis. no thyroid disease.     MEDICATIONS:     Current Outpatient Medications:     OZEMPIC, 1 MG/DOSE, 4 MG/3ML Subcutaneous Solution Pen-injector, INJECT 1 MG INTO THE SKIN WEEKLY., Disp: , Rfl:     metFORMIN HCl 1000 MG Oral Tab, Take 1 tablet (1,000 mg total) by mouth 2 (two) times daily with meals., Disp: , Rfl:     TRESIBA FLEXTOUCH 100 UNIT/ML Subcutaneous Solution Pen-injector, Inject 10 Units into the skin nightly. AT BEDTIME, Disp: , Rfl:     ofloxacin 0.3 % Ophthalmic Solution, Instill 1-2 gtt in both eyes q2-4h x2 days, then 1-2 gtt qid x5 days, Disp:  1 each, Rfl: 0    Blood Glucose Monitoring Suppl Does not apply Device, As Directed, Disp: , Rfl:     atorvastatin 20 MG Oral Tab, Take 1 tablet (20 mg total) by mouth nightly., Disp: 90 tablet, Rfl: 3    lisinopril 40 MG Oral Tab, Take 1 tablet (40 mg total) by mouth daily., Disp: 90 tablet, Rfl: 3    Semaglutide, 1 MG/DOSE, 2 MG/1.5ML Subcutaneous Solution Pen-injector, Inject into the skin., Disp: , Rfl:     aspirin 81 MG Oral Tab, Take 1 tablet (81 mg total) by mouth daily., Disp: , Rfl:     Coenzyme Q10 (COQ10 OR), Take by mouth., Disp: , Rfl:     Cholecalciferol (VITAMIN D3) 2000 UNITS Oral Tab, Take by mouth., Disp: , Rfl:     Vitamin B-12 1000 MCG Oral Tab, Take 1 tablet (1,000 mcg total) by mouth daily., Disp: , Rfl:     Ascorbic Acid (BRYON-C OR), Take by mouth., Disp: , Rfl:     CRANBERRY OR, Take by mouth., Disp: , Rfl:     DAILY MULTIPLE VITAMINS Oral Tab, Take 1 tablet by mouth daily., Disp: , Rfl:     Linolenic Acid (OMEGA 3 OR), Take by mouth., Disp: , Rfl:     Magnesium Chloride (MAGNESIUM DR OR), Take by mouth., Disp: , Rfl:     ALLERGIES:   Allergies[1]    SOCIAL HISTORY:   Social History     Socioeconomic History    Marital status:    Tobacco Use    Smoking status: Never    Smokeless tobacco: Never   Substance and Sexual Activity    Alcohol use: Yes     Alcohol/week: 0.0 standard drinks of alcohol    Drug use: No    Sexual activity: Not Currently       PAST MEDICAL HISTORY:   Past Medical History:    Diabetes (HCC)    Hypertension       PAST SURGICAL HISTORY:   Past Surgical History:   Procedure Laterality Date    Cholecystectomy      Other surgical history      D&C    Tonsillectomy         PHYSICAL EXAM:   Vitals:    01/21/25 1432 01/21/25 1525   BP: (!) 150/93 (!) 168/94   BP Location: Right arm Right arm   Patient Position: Sitting Sitting     BMI:   There is no height or weight on file to calculate BMI.    General Appearance:  alert, well developed, in no acute distress  Nutritional:   no extreme weight gain or loss  Head: Atraumatic  Eyes:  normal conjunctivae, sclera., normal sclera and normal pupils  Throat/Neck: normal sound to voice. Normal hearing, normal speech  Back: no kyphosis  Respiratory:  Speaking in full sentences, non-labored. no increased work of breathing, no audible wheezing    Skin:  normal moisture and skin texture, no visible lesions  Hair and nails: normal scalp hair  Hematologic:  no excessive bruising  Neuro: motor grossly intact, moving all extremities without difficulty  Psychiatric:  oriented to time, self, and place  Extremities: no obvious extremity swelling, no lesions    Diabetes Foot Exam:  Bilateral barefoot skin diabetic exam is normal, visualized feet and the appearance is normal.  Bilateral monofilament/sensation of both feet is normal.  Pulsation pedal pulse exam of both lower legs/feet is normal as well.    LABS: Pertinent labs reviewed    ASSESSMENT/PLAN:    -Reviewed with patient the pathogenesis of diabetes, clinical significance of A1c, and common complications such as: microvascular, macrovascular and diabetic ketoacidosis. Patient verbalizes understanding of the importance of glycemic control and the goals of therapy.     1.Type 2 Diabetes Mellitus, uncontrolled, with long term insulin use   -LAB DATA  HbA1C: 7.5% today   a) Medications  -  increase Tresiba 13--> 16 units nightly - Risks and benefits reviewed. Verbalizes understanding.  - continue with Metformin 1,000mg twice daily   - continue with Ozempic 1mg once weekly until home supply is finished   --> then transition to Mounjaro 5mg once weekly     - she does have a history of bladder infection/yeast infection; has a pessary for uterine prolapse. Will avoid sglt-2 medications.   - -discussed to cont. limiting carbs to 30-45gm per meal/ max 135gm per day.   - discussed to continue to stay active as much as safely able   - if glucose readings not improved in 1-2 weeks, she knows to call and notify  me  -reviewed target goal BG readings and A1C  -reviewed when to call and notify me of abnormal BG readings.     b) Nephropathy: GFR: 88 on 2024 --> check urine MA  c) UTD with optho -- followed by Dr. Mejia  d) Foot exam: normal today 2025  e) cont. Testing bg reading 1x daily - discussed to alternate testing times with fasting for before lunch/dinner meals   f) Life style changes reviewed     2. Hypertension   -lisinopril 40mg daily and Amlodipine 5mg once daily  - BP above goal while in clinic today   - patient has BP monitor at home; usually at goal  - discussed to recheck when she returns home today; if persistently above goal <140/90, then to call cardiology and notify them   - followed by cardiology Dr. Eduin Brandt)     3. Hyperlipidemia   - LDL: 62 and Tri on 2024  - on Atorvastatin 80mg nightly   - followed by cardiology Dr. Eduin Brandt)       RTC in 3-4 months   Patient instructed to call sooner if they develop Blood glucose readings <75 and/or if they have readings persistently >200.     The risks and benefits of my recommendations, as well as other treatment options were discussed with the patient today. questions were also answered to the best of my knowledge. Patient verbalizes understanding of these issues and agrees to the plan.    2025  LENA Claros         [1]   Allergies  Allergen Reactions    Sulfa Antibiotics HIVES    Codeine

## 2025-01-21 NOTE — PATIENT INSTRUCTIONS
A1C: 7.5% today --> previously was 7.0% on 8/13/2024  Blood glucose: 221 in clinic today    Medications:   -  increase Tresiba 13--> 16 units nightly   - continue with Metformin 1,000mg twice daily   - continue with Ozempic 1mg once weekly until home supply is finished   --> then transition to Mounjaro 5mg once weekly     --> prescription was sent to mail pharmacy to start 3/1    - repeat protein urine lab test in 4 weeks   -> no fasting needed; please make sure you are well hydrated before       Weight:  Wt Readings from Last 6 Encounters:   05/29/24 168 lb 12.8 oz (76.6 kg)   05/24/23 164 lb (74.4 kg)   05/06/23 163 lb (73.9 kg)   09/16/22 162 lb 6.4 oz (73.7 kg)   11/16/21 172 lb (78 kg)   09/23/21 172 lb (78 kg)     A1C goal:  <7.5%    Blood sugar testing:  Test your blood sugar 1 time daily   Recommended times to test: alternate with before breakfast (fasting) or before lunch or before dinner     Blood sugar targets:  Before breakfast:  (preferably < 110)  Before meals: <150     Call for persistent blood sugars < 75 or > 200

## 2025-01-28 ENCOUNTER — TELEPHONE (OUTPATIENT)
Dept: ENDOCRINOLOGY CLINIC | Facility: CLINIC | Age: 70
End: 2025-01-28

## 2025-01-28 NOTE — TELEPHONE ENCOUNTER
Patient is requesting to get her prescription for Mounjaro sent to the correct Mail [facility] Pharm[facility] which is [facility]. Fax # [phone].

## 2025-01-29 NOTE — TELEPHONE ENCOUNTER
Endocrine Refill protocol for oral and injectable diabetic medications    Protocol Criteria:  PASSED  Reason: N/A    If all below requirements are met, send a 90-day supply with 1 refill per provider protocol.    Verify appointment with Endocrinology completed in the last 6 months or scheduled in the next 3 months.  Verify A1C has been completed within the last 6 months and is below 8.5%     Last completed office visit: 1/21/2025 Thuy Day APRN   Next scheduled Follow up:   Future Appointments   Date Time Provider Department Center                 4/24/2025  1:45 PM Isufi, Marvina, APRN [facility]      Last A1c result: Last A1c value was 7.5% done 1/21/2025.

## 2025-01-30 RX ORDER — TIRZEPATIDE 5 MG/.5ML
5 INJECTION, SOLUTION SUBCUTANEOUS WEEKLY
Qty: 6 ML | Refills: 1 | Status: SHIPPED | OUTPATIENT
Start: 2025-03-03

## 2025-02-06 ENCOUNTER — OFFICE VISIT (OUTPATIENT)
Dept: INTERNAL MEDICINE CLINIC | Facility: CLINIC | Age: 70
End: 2025-02-06
Payer: MEDICARE

## 2025-02-06 VITALS
SYSTOLIC BLOOD PRESSURE: 106 MMHG | HEART RATE: 98 BPM | WEIGHT: 170.19 LBS | OXYGEN SATURATION: 98 % | HEIGHT: 63 IN | BODY MASS INDEX: 30.16 KG/M2 | TEMPERATURE: 97 F | DIASTOLIC BLOOD PRESSURE: 60 MMHG

## 2025-02-06 DIAGNOSIS — Z79.4 TYPE 2 DIABETES MELLITUS WITHOUT COMPLICATION, WITH LONG-TERM CURRENT USE OF INSULIN (HCC): ICD-10-CM

## 2025-02-06 DIAGNOSIS — I10 ESSENTIAL HYPERTENSION WITH GOAL BLOOD PRESSURE LESS THAN 140/90: ICD-10-CM

## 2025-02-06 DIAGNOSIS — E11.9 TYPE 2 DIABETES MELLITUS WITHOUT COMPLICATION, WITH LONG-TERM CURRENT USE OF INSULIN (HCC): ICD-10-CM

## 2025-02-06 DIAGNOSIS — N39.0 RECURRENT UTI: Primary | ICD-10-CM

## 2025-02-06 DIAGNOSIS — I10 ESSENTIAL HYPERTENSION: ICD-10-CM

## 2025-02-06 PROBLEM — Z96.0 PRESENCE OF PESSARY: Status: ACTIVE | Noted: 2025-02-06

## 2025-02-06 PROBLEM — Z84.1 FAMILY HISTORY OF RENAL FAILURE: Status: ACTIVE | Noted: 2025-02-06

## 2025-02-06 RX ORDER — ATORVASTATIN CALCIUM 80 MG/1
80 TABLET, FILM COATED ORAL NIGHTLY
COMMUNITY
Start: 2025-02-06

## 2025-02-17 NOTE — PROGRESS NOTES
[facility] part of [facility]  New Patient History and Physical      HPI:     Chief Complaint   Patient presents with    New Patient     Establishing care  Pt is taking atorvastatin 80mg per cardiology dep       Janice Hewitt is a 69 year old female presenting for:  Establish care.  Has  has a past medical history of Allergic rhinitis, Arthritis, Atherosclerosis of coronary artery (6/2023), Diabetes (HCC), Essential hypertension, Hyperlipidemia, and Hypertension.    She is the sister of one of my patients.     DM II follows with endocrinology.     HLD on atorvastatin.      Has hx of recurrent UTIs.  Pessary present.                Labs:   Complete Metabolic Panel:  Lab Results   Component Value Date/Time     (L) 05/16/2019 11:16 AM    K  05/16/2019 11:16 AM      Comment:      Test not reported due to hemolysis.       05/16/2019 11:16 AM    CO2 23.0 05/16/2019 11:16 AM    CREATSERUM 0.94 05/16/2019 11:16 AM    CA 8.7 05/16/2019 11:16 AM     (H) 05/16/2019 11:16 AM    TP 6.9 05/16/2019 11:16 AM    ALB 3.7 05/16/2019 11:16 AM    ALKPHO 49 (L) 05/16/2019 11:16 AM    AST  05/16/2019 11:16 AM      Comment:      Test not reported due to hemolysis.      ALT 28 05/16/2019 11:16 AM    BILT 0.5 05/16/2019 11:16 AM    TSH 1.630 05/16/2019 11:16 AM        CBC:  Lab Results   Component Value Date    WBC 6.6 05/16/2019    HGB 11.3 (L) 05/16/2019    HCT 35.7 05/16/2019    .0 05/16/2019    NEPERCENT 77.7 11/11/2021    LYPERCENT 9.5 11/11/2021    MOPERCENT 10.2 05/16/2019    EOPERCENT 4.7 05/16/2019    BAPERCENT 0.9 05/16/2019    NE 4.17 05/16/2019    LYMABS 1.38 05/16/2019    MOABSO 0.67 05/16/2019    EOABSO 0.31 05/16/2019    BAABSO 0.06 05/16/2019            Hemoglobin A1C, Microalbumin  Lab Results   Component Value Date/Time    A1C 7.5 (A) 01/21/2025 02:43 PM        Lipid panel  Lab Results   Component Value Date/Time    CHOLEST 155 05/16/2019 11:16 AM    HDL 60 (H) 05/16/2019 11:16  AM    TRIG 85 05/16/2019 11:16 AM    LDL 78 05/16/2019 11:16 AM    NONHDLC 95 05/16/2019 11:16 AM     The ASCVD Risk score (Jessy MONTEIRO, et al., 2019) failed to calculate for the following reasons:    Cannot find a previous HDL lab    Cannot find a previous total cholesterol lab       Medications:  Current Outpatient Medications   Medication Sig Dispense Refill    atorvastatin 80 MG Oral Tab Take 1 tablet (80 mg total) by mouth nightly.      nitrofurantoin monohydrate macro 100 MG Oral Cap Take 1 capsule (100 mg total) by mouth nightly.      METFORMIN & DIET MANAGE PROD OR       BD PEN NEEDLE MICRO U/F 32G X 6 MM Does not apply Misc 1 each daily.      ONETOUCH ULTRA In Vitro Strip by In Vitro route 3 (three) times daily.      [START ON 3/3/2025] Tirzepatide (MOUNJARO) 5 MG/0.5ML Subcutaneous Solution Auto-injector Inject 5 mg into the skin once a week. 6 mL 1    TRESIBA FLEXTOUCH 100 UNIT/ML Subcutaneous Solution Pen-injector Inject 16 Units into the skin nightly. AT BEDTIME      metFORMIN HCl 1000 MG Oral Tab Take 1 tablet (1,000 mg total) by mouth 2 (two) times daily with meals.      Blood Glucose Monitoring Suppl Does not apply Device As Directed      lisinopril 40 MG Oral Tab Take 1 tablet (40 mg total) by mouth daily. 90 tablet 3    aspirin 81 MG Oral Tab Take 1 tablet (81 mg total) by mouth daily.      Coenzyme Q10 (COQ10 OR) Take by mouth.      Cholecalciferol (VITAMIN D3) 2000 UNITS Oral Tab Take by mouth.      Ascorbic Acid (BRYON-C OR) Take by mouth.      CRANBERRY OR Take by mouth.      DAILY MULTIPLE VITAMINS Oral Tab Take 1 tablet by mouth daily.      Linolenic Acid (OMEGA 3 OR) Take by mouth.      Magnesium Chloride (MAGNESIUM DR OR) Take by mouth.        PMH:  Past Medical History:    Allergic rhinitis    Arthritis    Spine    Atherosclerosis of coronary artery    Diabetes (HCC)    Essential hypertension    Hyperlipidemia    Hypertension         PSH:  Past Surgical History:   Procedure Laterality Date     Cholecystectomy      Colonoscopy  2021    D & c            Other surgical history      D&C    Tonsillectomy         Allergies:  Allergies[1]   Social History:  Social History     Socioeconomic History    Marital status:    Tobacco Use    Smoking status: Never    Smokeless tobacco: Never   Substance and Sexual Activity    Alcohol use: Not Currently     Alcohol/week: 6.0 standard drinks of alcohol     Types: 6 Glasses of wine per week    Drug use: No    Sexual activity: Not Currently   Other Topics Concern    Caffeine Concern No    Exercise Yes    Seat Belt Yes    Special Diet Yes    Stress Concern No    Weight Concern No     Social Drivers of Health     Food Insecurity: No Food Insecurity (2025)    Received from [facility]    Hunger Vital Sign     Worried About Running Out of Food in the Last Year: Never true     Ran Out of Food in the Last Year: Never true   Transportation Needs: No Transportation Needs (2025)    Received from [facility]    PRAPARE - Transportation     Lack of Transportation (Medical): No     Lack of Transportation (Non-Medical): No   Housing Stability: Unknown (2025)    Received from [facility]    Housing Stability Vital Sign     Unable to Pay for Housing in the Last Year: No        Family History:  Family History   Problem Relation Age of Onset    Diabetes Mother         Both parents several relatives    Hypertension Mother     Obesity Mother     Hypertension Father     Obesity Brother         Stroke          REVIEW OF SYSTEMS:   Review of Systems   Constitutional:  Negative for chills, fatigue, fever and unexpected weight change.   HENT:  Negative for congestion, ear pain, hearing loss, rhinorrhea, sinus pain and sore throat.    Eyes:  Negative for pain, redness and visual disturbance.   Respiratory:  Negative for apnea, cough, chest tightness, shortness of breath and wheezing.    Cardiovascular:   Negative for chest pain, palpitations and leg swelling.   Gastrointestinal:  Negative for abdominal distention, abdominal pain, blood in stool, constipation and nausea.   Endocrine: Negative for cold intolerance, heat intolerance and polyuria.   Genitourinary:  Negative for dysuria, hematuria and urgency.   Musculoskeletal:  Negative for arthralgias, back pain, gait problem, joint swelling, myalgias and neck pain.   Skin:  Negative for rash and wound.   Allergic/Immunologic: Negative for food allergies and immunocompromised state.   Neurological:  Negative for dizziness, seizures, facial asymmetry, speech difficulty, weakness, light-headedness, numbness and headaches.   Hematological:  Negative for adenopathy. Does not bruise/bleed easily.   Psychiatric/Behavioral:  Negative for behavioral problems, sleep disturbance and suicidal ideas. The patient is not nervous/anxious.             PHYSICAL EXAM:   /60   Pulse 98   Temp 97 °F (36.1 °C)   Ht 5' 3\" (1.6 m)   Wt 170 lb 3.2 oz (77.2 kg)   SpO2 98%   BMI 30.15 kg/m²  Estimated body mass index is 30.15 kg/m² as calculated from the following:    Height as of this encounter: 5' 3\" (1.6 m).    Weight as of this encounter: 170 lb 3.2 oz (77.2 kg).     Wt Readings from Last 3 Encounters:   02/06/25 170 lb 3.2 oz (77.2 kg)   05/29/24 168 lb 12.8 oz (76.6 kg)   05/24/23 164 lb (74.4 kg)       Physical Exam  Vitals reviewed.   Constitutional:       General: She is not in acute distress.     Appearance: She is well-developed.   HENT:      Head: Normocephalic and atraumatic.   Eyes:      Conjunctiva/sclera: Conjunctivae normal.      Pupils: Pupils are equal, round, and reactive to light.   Neck:      Thyroid: No thyromegaly.   Cardiovascular:      Rate and Rhythm: Normal rate and regular rhythm.      Heart sounds: Normal heart sounds, S1 normal and S2 normal. No murmur heard.     No friction rub. No gallop.   Pulmonary:      Effort: Pulmonary effort is normal. No  respiratory distress.      Breath sounds: Normal breath sounds. No wheezing or rales.   Chest:      Chest wall: No tenderness.   Abdominal:      General: Bowel sounds are normal. There is no distension.      Palpations: Abdomen is soft. There is no mass.      Tenderness: There is no abdominal tenderness. There is no guarding or rebound.   Musculoskeletal:         General: No tenderness. Normal range of motion.      Cervical back: Normal range of motion.   Lymphadenopathy:      Cervical: No cervical adenopathy.   Skin:     General: Skin is warm.      Findings: No erythema or rash.   Neurological:      Mental Status: She is alert and oriented to person, place, and time.      Cranial Nerves: No cranial nerve deficit.      Deep Tendon Reflexes: Reflexes are normal and symmetric.   Psychiatric:         Behavior: Behavior normal.         Thought Content: Thought content normal.         Judgment: Judgment normal.             ASSESSMENT AND PLAN:   Patient is a 69 year old female who presents primarily presents for:    (N39.0) Recurrent UTI  (primary encounter diagnosis)  Discussed reoccurrence.  If occurs in the future will send macrobid.        (I10) Essential hypertension with goal blood pressure less than 140/90  Plan: On lisinopril.        (E11.9,  Z79.4) Type 2 diabetes mellitus without complication, with long-term current use of insulin (HCC)  Plan: Continue close follow up with endocrinology.                    Health Maintenance:  Health Maintenance   Topic Date Due    Diabetes Care: GFR  Never done    Annual Well Visit  Never done    Diabetes Care: Microalb/Creat Ratio (Annual)  Never done    Diabetes Care Dilated Eye Exam  03/29/2025    Mammogram  04/01/2025    COVID-19 Vaccine (9 - 2024-25 season) 04/28/2025    Diabetes Care A1C  07/21/2025    Colorectal Cancer Screening  02/11/2029    Influenza Vaccine  Completed    DEXA Scan  Completed    Annual Depression Screening  Completed    Fall Risk Screening (Annual)   Completed    Diabetes Care: Foot Exam (Annual)  Completed    Pneumococcal Vaccine: 50+ Years  Completed    Zoster Vaccines  Completed    Meningococcal B Vaccine  Aged Out             Meds & Refills for this Visit:  Requested Prescriptions      No prescriptions requested or ordered in this encounter       No orders of the defined types were placed in this encounter.      Imaging & Consults:  None        Vaughn Peace MD     Return in about 3 months (around 5/6/2025) for Medicare annual.  Important issues to follow up on next visit      Patient indicates understanding of the above recommendations and agrees to the above plan.  Reasurrance and education provided. All questions answered.  Notified to call with any questions, complications, allergies, or worsening or changing symptoms as well as any side effects or complications from the treatments .  Red flags/ ER precautions discussed.    This note was produced using voice recognition software.  As a result, errors may occur.  When identified, these errors have been corrected.  While every attempt is made to correct errors during dictation, errors may still exist.    Total time spent was 48 minutes which includes: Preparation to see patient including chart review, reviewing appropriate medical history, counseling and education (diet and exercise), discussing treatment options, ordering appropriate diagnostic tests and documentation.    As part of our commitment to providing you with comprehensive, transparent, and timely access to your health information, we adhere to the guidelines set forth by the 21st Century Cures Act. This Act enhances your rights to access your electronic health information and ensures that you can easily obtain your medical records.  Please note that the verbage used in this note is intended for medical documentation and communication and may be interpreted as forthright.  Please do not hesitate to contact my office if you have any  questions.        Vaughn Peace MD  Internal Medicine/Primary Care  [facility]                   [1]   Allergies  Allergen Reactions    Sulfa Antibiotics HIVES    Codeine

## 2025-02-25 ENCOUNTER — TELEPHONE (OUTPATIENT)
Dept: ENDOCRINOLOGY CLINIC | Facility: CLINIC | Age: 70
End: 2025-02-25

## 2025-02-28 NOTE — TELEPHONE ENCOUNTER
Approved   2/27/2025  9:56 PM  Appeal supported: No   Note from payer: [place] Case: [id number], Status: Approved, Coverage Starts on: 1/1/2025 12:00:00 AM, Coverage Ends on: 12/31/2025 12:00:00 AM. Questions? Contact [phone].   Payer: Josh Case ID: [id number]    [phone]    [phone]    Pt made aware of approval via [employer].

## 2025-03-03 ENCOUNTER — LAB ENCOUNTER (OUTPATIENT)
Dept: LAB | Facility: HOSPITAL | Age: 70
End: 2025-03-03
Attending: NURSE PRACTITIONER
Payer: MEDICARE

## 2025-03-03 DIAGNOSIS — E11.65 TYPE 2 DIABETES MELLITUS WITH HYPERGLYCEMIA, WITH LONG-TERM CURRENT USE OF INSULIN (HCC): ICD-10-CM

## 2025-03-03 DIAGNOSIS — Z79.4 TYPE 2 DIABETES MELLITUS WITH HYPERGLYCEMIA, WITH LONG-TERM CURRENT USE OF INSULIN (HCC): ICD-10-CM

## 2025-03-03 LAB
CREAT UR-SCNC: 64.6 MG/DL
MICROALBUMIN UR-MCNC: 4 MG/DL
MICROALBUMIN/CREAT 24H UR-RTO: 61.9 UG/MG (ref ?–30)

## 2025-03-03 PROCEDURE — 82043 UR ALBUMIN QUANTITATIVE: CPT

## 2025-03-03 PROCEDURE — 82570 ASSAY OF URINE CREATININE: CPT

## 2025-04-01 ENCOUNTER — TELEPHONE (OUTPATIENT)
Dept: ENDOCRINOLOGY CLINIC | Facility: CLINIC | Age: 70
End: 2025-04-01

## 2025-04-01 NOTE — TELEPHONE ENCOUNTER
Received a fax of patients most recent eye exam dated on 3/27/25 with Macrina Mejia MD at [facility]. Eye exam was documented in patient's 'Diabetes Flowsheet' and placed in providers folder for review.

## 2025-04-03 ENCOUNTER — HOSPITAL ENCOUNTER (OUTPATIENT)
Dept: MAMMOGRAPHY | Facility: HOSPITAL | Age: 70
Discharge: HOME | End: 2025-04-03
Attending: OBSTETRICS & GYNECOLOGY
Payer: MEDICARE

## 2025-04-03 DIAGNOSIS — Z12.31 ENCOUNTER FOR SCREENING MAMMOGRAM FOR BREAST CANCER: ICD-10-CM

## 2025-04-03 PROCEDURE — 77063 BREAST TOMOSYNTHESIS BI: CPT | Performed by: OBSTETRICS & GYNECOLOGY

## 2025-04-03 PROCEDURE — 77067 SCR MAMMO BI INCL CAD: CPT | Performed by: OBSTETRICS & GYNECOLOGY

## 2025-04-24 ENCOUNTER — OFFICE VISIT (OUTPATIENT)
Dept: ENDOCRINOLOGY CLINIC | Facility: CLINIC | Age: 70
End: 2025-04-24
Payer: MEDICARE

## 2025-04-24 VITALS
HEART RATE: 91 BPM | DIASTOLIC BLOOD PRESSURE: 77 MMHG | WEIGHT: 175 LBS | HEIGHT: 63 IN | SYSTOLIC BLOOD PRESSURE: 131 MMHG | BODY MASS INDEX: 31.01 KG/M2

## 2025-04-24 DIAGNOSIS — E11.65 TYPE 2 DIABETES MELLITUS WITH HYPERGLYCEMIA, WITH LONG-TERM CURRENT USE OF INSULIN (HCC): Primary | ICD-10-CM

## 2025-04-24 DIAGNOSIS — Z79.4 TYPE 2 DIABETES MELLITUS WITH HYPERGLYCEMIA, WITH LONG-TERM CURRENT USE OF INSULIN (HCC): Primary | ICD-10-CM

## 2025-04-24 LAB
CARTRIDGE EXPIRATION DATE: ABNORMAL DATE
GLUCOSE BLOOD: 145
HEMOGLOBIN A1C: 7 % (ref 4.3–5.6)
TEST STRIP EXPIRATION DATE: NORMAL DATE
TEST STRIP LOT #: NORMAL NUMERIC

## 2025-04-24 PROCEDURE — 82947 ASSAY GLUCOSE BLOOD QUANT: CPT | Performed by: NURSE PRACTITIONER

## 2025-04-24 PROCEDURE — 99213 OFFICE O/P EST LOW 20 MIN: CPT | Performed by: NURSE PRACTITIONER

## 2025-04-24 PROCEDURE — 3060F POS MICROALBUMINURIA REV: CPT | Performed by: NURSE PRACTITIONER

## 2025-04-24 PROCEDURE — 3075F SYST BP GE 130 - 139MM HG: CPT | Performed by: NURSE PRACTITIONER

## 2025-04-24 PROCEDURE — 83036 HEMOGLOBIN GLYCOSYLATED A1C: CPT | Performed by: NURSE PRACTITIONER

## 2025-04-24 PROCEDURE — 1159F MED LIST DOCD IN RCRD: CPT | Performed by: NURSE PRACTITIONER

## 2025-04-24 PROCEDURE — 3061F NEG MICROALBUMINURIA REV: CPT | Performed by: NURSE PRACTITIONER

## 2025-04-24 PROCEDURE — 3008F BODY MASS INDEX DOCD: CPT | Performed by: NURSE PRACTITIONER

## 2025-04-24 PROCEDURE — 1160F RVW MEDS BY RX/DR IN RCRD: CPT | Performed by: NURSE PRACTITIONER

## 2025-04-24 PROCEDURE — 3078F DIAST BP <80 MM HG: CPT | Performed by: NURSE PRACTITIONER

## 2025-04-24 PROCEDURE — 3051F HG A1C>EQUAL 7.0%<8.0%: CPT | Performed by: NURSE PRACTITIONER

## 2025-04-24 NOTE — PROGRESS NOTES
Name: Janice Hewitt  Date: 2025    CHIEF COMPLAINT   F/u on DM     HISTORY OF PRESENT ILLNESS   Janice Hewitt is a 69 year old female who presents for follow up on diabetes management.   HbA1C: 7.0% at POC today. Previously was 7.5% on 2025.   Blood glucose is: 145 in clinic today.     FAMILY HISTORY OF DIABETES  - both sides of the family   - has grandson who has T1DM    DIABETES HISTORY  Diagnosed: around 20 years; stated as GDM  - started insulin therapy 3/2020  Prior HbA, C or glycohemoglobin were 7.5% 2025; 7.0% at POC today     Patient has had hospitalizations for blood sugar issues related to hyperglycemia in pregnancy (while having GDM).   Denies any history of pancreatitis.     PREVIOUS MEDICATION FOR DM:  - Glipizide - does not remember why it was stopped   - Trulicity -d/c'ed GI intolerance symptoms     CURRENT MEDICATIONS FOR DM:  - Tresiba 16 units nightly   - Metformin 1,000mg twice daily   - Mounjaro 5mg once weekly - has had 2 doses so far; feeling full and bloated; she is unsure if these symptoms are related to mounjaro- but the symptoms are mild in severity and she feels comfortable with continuing to take medication/dose    HOME GLUCOSE READINGS:   Testing BG x  1 daily  Meter or log book today: no  Fastin, 89, 92, 98, 101, 87, 107, 87, 99, 96, 95, 92, 116, 100, 111, 88, 124,   Hypoglycemia present: no   Hypoglycemia awareness: yes - lightheadedness; dizziness  Hypoglycemia treatment: yes - eats a carb     HISTORY OF DIABETES COMPLICATIONS:  History of Retinopathy: denies  - last eye exam within the last 12 months: yes - 3/27/2025 - followed by Dr. Mejia   History of Neuropathy: yes bilateral great toes intermittently but infrequently occurring   History of Nephropathy: denies outside of kidney infection     ASSOCIATED COMPLICATIONS:   HTN: yes   Hyperlipidemia: yes   Cardiovascular Disease: yes - angio showed CAD with 20% blockage in   Peripheral Vascular Disease: denies      DIETARY COMPLIANCE:  Good; following a low carb diet     EXERCISE:   No     Polyuria, polyphagia, polydipsia: denies   Paresthesias: yes - intermittently and infrequently occurring to bilateral great toes   Blurred vision: denies   Recent steroids, illness or infections: no    REVIEW OF SYSTEMS  Constitutional: Negative for: weight change, fever, fatigue, cold/heat intolerance  Eyes: Negative for:  Visual changes, proptosis, blurring  ENT: Negative for:  dysphagia, neck swelling, dysphonia  Respiratory: Negative for: hemoptysis, shortness of breath, cough, or dyspnea.  Cardiovascular: Negative for:  chest pain, chest discomfort, palpitations  GI: Negative for:  abdominal pain, nausea, vomiting, diarrhea, heartburn, constipation  Neurology: Negative for: headache, dizziness, syncope, numbness/tingling, or weakness.   Genito-Urinary: Negative for: dysuria, frequency or hematuria   Hematology/Lymphatics: Negative for: bruising, easy bleeding, lower extremity edema  Skin: Negative for: rash, blister, infection or ulcers.  Endocrine: Negative for: polyuria, polydipsia. no osteoporosis. no thyroid disease.     MEDICATIONS:     Current Outpatient Medications:     atorvastatin 80 MG Oral Tab, Take 1 tablet (80 mg total) by mouth nightly., Disp: , Rfl:     nitrofurantoin monohydrate macro 100 MG Oral Cap, Take 1 capsule (100 mg total) by mouth nightly., Disp: , Rfl:     METFORMIN & DIET MANAGE PROD OR, , Disp: , Rfl:     BD PEN NEEDLE MICRO U/F 32G X 6 MM Does not apply Misc, 1 each daily., Disp: , Rfl:     ONETOUCH ULTRA In Vitro Strip, by In Vitro route 3 (three) times daily., Disp: , Rfl:     Tirzepatide (MOUNJARO) 5 MG/0.5ML Subcutaneous Solution Auto-injector, Inject 5 mg into the skin once a week., Disp: 6 mL, Rfl: 1    TRESIBA FLEXTOUCH 100 UNIT/ML Subcutaneous Solution Pen-injector, Inject 16 Units into the skin nightly. AT BEDTIME, Disp: , Rfl:     metFORMIN HCl 1000 MG Oral Tab, Take 1 tablet (1,000 mg total)  by mouth 2 (two) times daily with meals., Disp: , Rfl:     Blood Glucose Monitoring Suppl Does not apply Device, As Directed, Disp: , Rfl:     lisinopril 40 MG Oral Tab, Take 1 tablet (40 mg total) by mouth daily., Disp: 90 tablet, Rfl: 3    aspirin 81 MG Oral Tab, Take 1 tablet (81 mg total) by mouth daily., Disp: , Rfl:     Coenzyme Q10 (COQ10 OR), Take by mouth., Disp: , Rfl:     Cholecalciferol (VITAMIN D3) 2000 UNITS Oral Tab, Take by mouth., Disp: , Rfl:     Ascorbic Acid (BRYON-C OR), Take by mouth., Disp: , Rfl:     CRANBERRY OR, Take by mouth., Disp: , Rfl:     DAILY MULTIPLE VITAMINS Oral Tab, Take 1 tablet by mouth daily., Disp: , Rfl:     Linolenic Acid (OMEGA 3 OR), Take by mouth., Disp: , Rfl:     Magnesium Chloride (MAGNESIUM DR OR), Take by mouth., Disp: , Rfl:     ALLERGIES:   Allergies[1]    SOCIAL HISTORY:   Social History     Socioeconomic History    Marital status:    Tobacco Use    Smoking status: Never    Smokeless tobacco: Never   Substance and Sexual Activity    Alcohol use: Not Currently     Alcohol/week: 6.0 standard drinks of alcohol     Types: 6 Glasses of wine per week    Drug use: No    Sexual activity: Not Currently   Other Topics Concern    Caffeine Concern No    Exercise Yes    Seat Belt Yes    Special Diet Yes    Stress Concern No    Weight Concern No       PAST MEDICAL HISTORY:   Past Medical History:    Allergic rhinitis    Arthritis    Spine    Atherosclerosis of coronary artery    Diabetes (HCC)    Essential hypertension    Hyperlipidemia    Hypertension       PAST SURGICAL HISTORY:   Past Surgical History:   Procedure Laterality Date    Cholecystectomy      Colonoscopy  2021    D & c            Other surgical history      D&C    Tonsillectomy         PHYSICAL EXAM:   Vitals:    25 1422   BP: 131/77   Pulse: 91   Weight: 175 lb (79.4 kg)   Height: 5' 3\" (1.6 m)     BMI:   Body mass index is 31 kg/m².    General Appearance:  alert, well developed, in  no acute distress  Nutritional:  no extreme weight gain or loss  Head: Atraumatic  Eyes:  normal conjunctivae, sclera., normal sclera and normal pupils  Throat/Neck: normal sound to voice. Normal hearing, normal speech  Back: no kyphosis  Respiratory:  Speaking in full sentences, non-labored. no increased work of breathing, no audible wheezing    Skin:  normal moisture and skin texture, no visible lesions  Hair and nails: normal scalp hair  Hematologic:  no excessive bruising  Neuro: motor grossly intact, moving all extremities without difficulty  Psychiatric:  oriented to time, self, and place  Extremities: no obvious extremity swelling, no lesions    LABS: Pertinent labs reviewed    ASSESSMENT/PLAN:    -Reviewed with patient the pathogenesis of diabetes, clinical significance of A1c, and common complications such as: microvascular, macrovascular and diabetic ketoacidosis. Patient verbalizes understanding of the importance of glycemic control and the goals of therapy.     1.Type 2 Diabetes Mellitus, uncontrolled, with long term insulin use   -LAB DATA  HbA1C: 7.0% today   a) Medications  - decrease Tresiba 16 --> 14units nightly - Risks and benefits reviewed. Verbalizes understanding.  - continue with Metformin 1,000mg twice daily   - continue with Mounjaro 5mg once weekly     - she does have a history of bladder infection/yeast infection; has a pessary for uterine prolapse. Will avoid sglt-2 medications.   - -discussed to cont. limiting carbs to 30-45gm per meal/ max 135gm per day.   - discussed to continue to stay active as much as safely able   -reviewed target goal BG readings and A1C  -reviewed when to call and notify me of abnormal BG readings.     b) Nephropathy: GFR: 61.9 on 3/3/2025 --> repeat urine MA; discussed increase PO hydration   c) UTD with optho -- followed by Dr. Mejia  d) Foot exam: normal on 1/21/2025  e) cont. Testing bg reading 1x daily - discussed to alternate testing times with fasting for  before lunch/dinner meals   f) Life style changes reviewed     2. Hypertension   -lisinopril 40mg daily and Amlodipine 5mg once daily  - followed by cardiology Dr. Eduin Hand ([place])   - normotensive today     3. Hyperlipidemia   - LDL: 62 and Tri on 2024  - on Atorvastatin 80mg nightly   - followed by cardiology Dr. Eduin Brandt)       RTC in 4 months   Patient instructed to call sooner if they develop Blood glucose readings <75 and/or if they have readings persistently >200.     The risks and benefits of my recommendations, as well as other treatment options were discussed with the patient today. questions were also answered to the best of my knowledge. Patient verbalizes understanding of these issues and agrees to the plan.    2025  LENA Claros         [1]   Allergies  Allergen Reactions    Sulfa Antibiotics HIVES    Codeine       APPTS ARE READY TO BE MADE: [X] YES

## 2025-04-24 NOTE — PATIENT INSTRUCTIONS
A1C: 7.0% today --> previously was 7.5% on 1/21/2025  Blood glucose: 145 in clinic today    Medications:   - decrease Tresiba 16 --> 14 units nightly   - continue with Metformin 1,000mg twice daily   - continue with Mounjaro 5mg once weekly     - repeat protein urine lab in 6/2025  - please give me an update on your tolerance to Mounjaro and also glucose readings in 2.5 months (while on your last 1-2 pens)      Weight:  Wt Readings from Last 6 Encounters:   04/24/25 175 lb (79.4 kg)   02/06/25 170 lb 3.2 oz (77.2 kg)   05/29/24 168 lb 12.8 oz (76.6 kg)   05/24/23 164 lb (74.4 kg)   05/06/23 163 lb (73.9 kg)   09/16/22 162 lb 6.4 oz (73.7 kg)     A1C goal:  <7.5%    Blood sugar testing:  Test your blood sugar 1 time daily   Recommended times to test: alternate with before breakfast (fasting) or 2hrs after meals     Blood sugar targets:  Before breakfast:  (preferably < 110)  Before meals: <150   2 hours after meals: <180 (preferably <150)     Call for persistent blood sugars < 75 or > 200

## 2025-06-02 RX ORDER — TIRZEPATIDE 5 MG/.5ML
INJECTION, SOLUTION SUBCUTANEOUS
Qty: 6 ML | Refills: 0 | Status: SHIPPED | OUTPATIENT
Start: 2025-06-02

## 2025-06-02 NOTE — TELEPHONE ENCOUNTER
Endocrine Refill protocol for oral and injectable diabetic medications    Protocol Criteria:  PASSED  Reason: N/A    If all below requirements are met, send a 90-day supply with 1 refill per provider protocol.    Verify appointment with Endocrinology completed in the last 6 months or scheduled in the next 3 months.  Verify A1C has been completed within the last 6 months and is below 8.5%     Last completed office visit: 4/24/2025 Thuy Day APRN   Next scheduled Follow up:   Future Appointments   Date Time Provider Department Center   8/26/2025  2:30 PM Isufi, Marvina, APRN [facility]      Last A1c result: Last A1c value was 7% done 4/24/2025.

## 2025-06-19 ENCOUNTER — LAB ENCOUNTER (OUTPATIENT)
Dept: LAB | Facility: HOSPITAL | Age: 70
End: 2025-06-19
Attending: NURSE PRACTITIONER
Payer: MEDICARE

## 2025-06-19 DIAGNOSIS — E11.65 TYPE 2 DIABETES MELLITUS WITH HYPERGLYCEMIA, WITH LONG-TERM CURRENT USE OF INSULIN (HCC): ICD-10-CM

## 2025-06-19 DIAGNOSIS — Z79.4 TYPE 2 DIABETES MELLITUS WITH HYPERGLYCEMIA, WITH LONG-TERM CURRENT USE OF INSULIN (HCC): ICD-10-CM

## 2025-06-19 LAB
CREAT UR-SCNC: 74 MG/DL
MICROALBUMIN UR-MCNC: 1.1 MG/DL
MICROALBUMIN/CREAT 24H UR-RTO: 14.9 UG/MG (ref ?–30)

## 2025-06-19 PROCEDURE — 82570 ASSAY OF URINE CREATININE: CPT

## 2025-06-19 PROCEDURE — 82043 UR ALBUMIN QUANTITATIVE: CPT

## 2025-07-09 ENCOUNTER — TELEPHONE (OUTPATIENT)
Dept: OBGYN CLINIC | Facility: CLINIC | Age: 70
End: 2025-07-09

## 2025-07-09 NOTE — TELEPHONE ENCOUNTER
Patient has scheduled annual visit for 11/4/2025 but patient would like to get pessary checked during the same visit date. Patient was informed that pessary check is a separate visit, but the patient insists that Dr whitley always checks her pessary during the same visit as the Annual Check up. Should patient schedule a separate visit for pessary check?

## 2025-07-09 NOTE — TELEPHONE ENCOUNTER
Patient states she has no concerns at this time. She states she cleans the pessary daily. Patient states she will await appointment for 11/4/25, will call with any concerns.

## 2025-08-05 ENCOUNTER — MED MANAGEMENT (OUTPATIENT)
Age: 70
End: 2025-08-05

## 2025-08-07 ENCOUNTER — TELEPHONE (OUTPATIENT)
Dept: ENDOCRINOLOGY CLINIC | Facility: CLINIC | Age: 70
End: 2025-08-07

## 2025-08-07 DIAGNOSIS — E11.65 TYPE 2 DIABETES MELLITUS WITH HYPERGLYCEMIA, WITH LONG-TERM CURRENT USE OF INSULIN (HCC): Primary | ICD-10-CM

## 2025-08-07 DIAGNOSIS — Z79.4 TYPE 2 DIABETES MELLITUS WITH HYPERGLYCEMIA, WITH LONG-TERM CURRENT USE OF INSULIN (HCC): Primary | ICD-10-CM

## 2025-08-07 RX ORDER — INSULIN DEGLUDEC 100 U/ML
14 INJECTION, SOLUTION SUBCUTANEOUS NIGHTLY
Qty: 12 ML | Refills: 0 | Status: SHIPPED | OUTPATIENT
Start: 2025-08-07

## 2025-08-26 ENCOUNTER — OFFICE VISIT (OUTPATIENT)
Dept: ENDOCRINOLOGY CLINIC | Facility: CLINIC | Age: 70
End: 2025-08-26

## 2025-08-26 VITALS
HEIGHT: 63 IN | WEIGHT: 179.19 LBS | RESPIRATION RATE: 18 BRPM | SYSTOLIC BLOOD PRESSURE: 135 MMHG | BODY MASS INDEX: 31.75 KG/M2 | DIASTOLIC BLOOD PRESSURE: 85 MMHG | HEART RATE: 88 BPM

## 2025-08-26 DIAGNOSIS — E11.9 TYPE 2 DIABETES MELLITUS WITHOUT COMPLICATION, WITH LONG-TERM CURRENT USE OF INSULIN (HCC): Primary | ICD-10-CM

## 2025-08-26 DIAGNOSIS — Z79.4 TYPE 2 DIABETES MELLITUS WITHOUT COMPLICATION, WITH LONG-TERM CURRENT USE OF INSULIN (HCC): Primary | ICD-10-CM

## 2025-08-26 LAB
GLUCOSE BLOOD: 185
HEMOGLOBIN A1C: 6.4 % (ref 4.3–5.6)
TEST STRIP LOT #: NORMAL NUMERIC

## 2025-08-26 PROCEDURE — 3075F SYST BP GE 130 - 139MM HG: CPT | Performed by: NURSE PRACTITIONER

## 2025-08-26 PROCEDURE — 3079F DIAST BP 80-89 MM HG: CPT | Performed by: NURSE PRACTITIONER

## 2025-08-26 PROCEDURE — 82947 ASSAY GLUCOSE BLOOD QUANT: CPT | Performed by: NURSE PRACTITIONER

## 2025-08-26 PROCEDURE — 1159F MED LIST DOCD IN RCRD: CPT | Performed by: NURSE PRACTITIONER

## 2025-08-26 PROCEDURE — 1160F RVW MEDS BY RX/DR IN RCRD: CPT | Performed by: NURSE PRACTITIONER

## 2025-08-26 PROCEDURE — 83036 HEMOGLOBIN GLYCOSYLATED A1C: CPT | Performed by: NURSE PRACTITIONER

## 2025-08-26 PROCEDURE — 99213 OFFICE O/P EST LOW 20 MIN: CPT | Performed by: NURSE PRACTITIONER

## 2025-08-26 PROCEDURE — 3044F HG A1C LEVEL LT 7.0%: CPT | Performed by: NURSE PRACTITIONER

## 2025-08-26 PROCEDURE — 3061F NEG MICROALBUMINURIA REV: CPT | Performed by: NURSE PRACTITIONER

## 2025-08-26 PROCEDURE — 3008F BODY MASS INDEX DOCD: CPT | Performed by: NURSE PRACTITIONER

## 2025-08-26 RX ORDER — LISINOPRIL 40 MG/1
40 TABLET ORAL DAILY
Qty: 90 TABLET | Refills: 3 | Status: SHIPPED | OUTPATIENT
Start: 2025-08-26

## 2025-08-26 RX ORDER — TIRZEPATIDE 7.5 MG/.5ML
7.5 INJECTION, SOLUTION SUBCUTANEOUS WEEKLY
Qty: 6 ML | Refills: 0 | Status: SHIPPED | OUTPATIENT
Start: 2025-08-26

## 2025-08-26 RX ORDER — INSULIN DEGLUDEC 100 U/ML
12 INJECTION, SOLUTION SUBCUTANEOUS NIGHTLY
COMMUNITY
Start: 2025-08-26